# Patient Record
Sex: FEMALE | Race: WHITE | NOT HISPANIC OR LATINO | URBAN - METROPOLITAN AREA
[De-identification: names, ages, dates, MRNs, and addresses within clinical notes are randomized per-mention and may not be internally consistent; named-entity substitution may affect disease eponyms.]

---

## 2020-10-06 ENCOUNTER — VIRTUAL VISIT (OUTPATIENT)
Dept: FAMILY MEDICINE | Facility: OTHER | Age: 70
End: 2020-10-06
Payer: COMMERCIAL

## 2020-10-06 PROCEDURE — 99421 OL DIG E/M SVC 5-10 MIN: CPT | Performed by: PHYSICIAN ASSISTANT

## 2020-10-07 ENCOUNTER — AMBULATORY - HEALTHEAST (OUTPATIENT)
Dept: FAMILY MEDICINE | Facility: CLINIC | Age: 70
End: 2020-10-07

## 2020-10-07 DIAGNOSIS — Z20.822 SUSPECTED COVID-19 VIRUS INFECTION: ICD-10-CM

## 2020-10-08 ENCOUNTER — AMBULATORY - HEALTHEAST (OUTPATIENT)
Dept: FAMILY MEDICINE | Facility: CLINIC | Age: 70
End: 2020-10-08

## 2020-10-08 DIAGNOSIS — Z20.822 SUSPECTED COVID-19 VIRUS INFECTION: ICD-10-CM

## 2020-10-08 NOTE — PROGRESS NOTES
"Date: 10/06/2020 11:12:39  Clinician: Dandy Guerin  Clinician NPI: [id number]  Patient: Consuelo Rosenthal  Patient : 1950  Patient Address[place] [facility] [place] [id number]  Patient Phone: [phone]  Visit Protocol: URI  Patient Summary:  Consuelo is a 70 year old ( : 1950 ) female who initiated a [facility] Visit for COVID-19 (Coronavirus) evaluation and screening. When asked the question \"Please sign me up to receive news, health information and promotions from [facility].\", Consuelo responded \"Yes\".    Consuelo states her symptoms started 1-2 days ago.   Her symptoms consist of ear pain, a cough, nasal congestion, myalgia, malaise, and a sore throat.   Symptom details     Nasal secretions: The color of her mucus is clear.    Cough: Consuelo coughs a few times an hour and her cough is more bothersome at night. Phlegm does not come into her throat when she coughs. She believes her cough is caused by post-nasal drip.     Sore throat: Consuelo reports having mild throat pain (1-3 on a 10 point pain scale), does not have exudate on her tonsils, and can swallow liquids. The lymph nodes in her neck are not enlarged. A rash has not appeared on the skin since the sore throat started.      Consuelo denies having headache, wheezing, fever, enlarged lymph nodes, anosmia, vomiting, rhinitis, nausea, facial pain or pressure, chills, teeth pain, ageusia, and diarrhea. She also denies taking antibiotic medication in the past month and having recent facial or sinus surgery in the past 60 days. She is not experiencing dyspnea.   Precipitating events  Within the past week, Consuelo has not been exposed to someone with strep throat. She has not recently been exposed to someone with influenza. Consuelo has been in close contact with the following high risk individuals: adults 65 or older and people with asthma, heart disease or diabetes.   Pertinent COVID-19 (Coronavirus) information  In the past 14 days, " Consuelo has not worked in a congregate living setting.   She does not work or volunteer as healthcare worker or a  and does not work or volunteer in a healthcare facility.   Consuelo also has not lived in a congregate living setting in the past 14 days. She does not live with a healthcare worker.   Consuelo has not had a close contact with a laboratory-confirmed COVID-19 patient within 14 days of symptom onset.   Since December 2019, Consuelo and has not had upper respiratory infection or influenza-like illness. Has not been diagnosed with lab-confirmed COVID-19 test   Pertinent medical history  Consuelo does not get yeast infections when she takes antibiotics.   Consuelo does not need a return to work/school note.   Weight: 157 lbs   Consuelo does not smoke or use smokeless tobacco.   Weight: 157 lbs    MEDICATIONS: aspirin oral, metoprolol tartrate-hydrochlorothiazide oral, simvastatin oral, ALLERGIES: NKDA  Clinician Response:  Dear Consuelo,   Your symptoms show that you may have coronavirus (COVID-19). This illness can cause fever, cough and trouble breathing. Many people get a mild case and get better on their own. Some people can get very sick.  What should I do?  We would like to test you for this virus.   1. Please call [phone] to schedule your visit. Explain that you were referred by [facility] to have a COVID-19 test. Be ready to share your [facility] visit ID number.  The following will serve as your written order for this COVID Test, ordered by me, for the indication of suspected COVID [Z20.828]: The test will be ordered in [facility], our electronic health record, after you are scheduled. It will show as ordered and authorized by Chino Jacobson MD.  Order: COVID-19 (Coronavirus) PCR for SYMPTOMATIC testing from [facility].      2. When it's time for your COVID test:  Stay at least 6 feet away from others. (If someone will drive you to your test, stay in the backseat, as far away from the  as you  "can.)   Cover your mouth and nose with a mask, tissue or washcloth.  Go straight to the testing site. Don't make any stops on the way there or back.      3.Starting now: Stay home and away from others (self-isolate) until:   You've had no fever---and no medicine that reduces fever---for one full day (24 hours). And...   Your other symptoms have gotten better. For example, your cough or breathing has improved. And...   At least 10 days have passed since your symptoms started.       During this time, don't leave the house except for testing or medical care.   Stay in your own room, even for meals. Use your own bathroom if you can.   Stay away from others in your home. No hugging, kissing or shaking hands. No visitors.  Don't go to work, school or anywhere else.    Clean \"high touch\" surfaces often (doorknobs, counters, handles, etc.). Use a household cleaning spray or wipes. You'll find a full list of  on the EPA website: www.epa.gov/pesticide-registration/list-n-disinfectants-use-against-sars-cov-2.   Cover your mouth and nose with a mask, tissue or washcloth to avoid spreading germs.  Wash your hands and face often. Use soap and water.  Caregivers in these groups are at risk for severe illness due to COVID-19:  o People 65 years and older  o People who live in a nursing home or long-term care facility  o People with chronic disease (lung, heart, cancer, diabetes, kidney, liver, immunologic)  o People who have a weakened immune system, including those who:   Are in cancer treatment  Take medicine that weakens the immune system, such as corticosteroids  Had a bone marrow or organ transplant  Have an immune deficiency  Have poorly controlled HIV or AIDS  Are obese (body mass index of 40 or higher)  Smoke regularly   o Caregivers should wear gloves while washing dishes, handling laundry and cleaning bedrooms and bathrooms.  o Use caution when washing and drying laundry: Don't shake dirty laundry, and use the " warmest water setting that you can.  o For more tips, go to www.cdc.gov/coronavirus/2019-ncov/downloads/10Things.pdf.    How can I take care of myself?    Get lots of rest. Drink extra fluids (unless a doctor has told you not to).   Take Tylenol (acetaminophen) for fever or pain. If you have liver or kidney problems, ask your family doctor if it's okay to take Tylenol.   Adults can take either:    650 mg (two 325 mg pills) every 4 to 6 hours, or...   1,000 mg (two 500 mg pills) every 8 hours as needed.    Note: Don't take more than 3,000 mg in one day. Acetaminophen is found in many medicines (both prescribed and over-the-counter medicines). Read all labels to be sure you don't take too much.   For children, check the Tylenol bottle for the right dose. The dose is based on the child's age or weight.    If you have other health problems (like cancer, heart failure, an organ transplant or severe kidney disease): Call your specialty clinic if you don't feel better in the next 2 days.       Know when to call 911. Emergency warning signs include:    Trouble breathing or shortness of breath Pain or pressure in the chest that doesn't go away Feeling confused like you haven't felt before, or not being able to wake up Bluish-colored lips or face.  Where can I get more information?   [facility] -- About COVID-19: [email or web address]   CDC -- What to Do If You're Sick: www.cdc.gov/coronavirus/2019-ncov/about/steps-when-sick.html   CDC -- Ending Home Isolation: www.cdc.gov/coronavirus/2019-ncov/hcp/disposition-in-home-patients.html   CDC -- Caring for Someone: www.cdc.gov/coronavirus/2019-ncov/if-you-are-sick/care-for-someone.html   [facility] -- Interim Guidance for Hospital Discharge to Home: www.health.[facility].[place]/diseases/coronavirus/hcp/hospdischarge.pdf   [facility] clinical trials (COVID-19 research studies): clinicalaffairs.[facility]-clinical-trials    Below are the COVID-19 hotlines at the  [place] Department of Health ([facility]). Interpreters are available.    For health questions: Call 604-493-9937 or 1-577.260.1693 (7 a.m. to 7 p.m.) For questions about schools and childcare: Call 518-500-1571 or 1-816.638.6983 (7 a.m. to 7 p.m.)    Diagnosis: Nasal congestion  Diagnosis ICD: R09.81

## 2020-10-10 ENCOUNTER — COMMUNICATION - HEALTHEAST (OUTPATIENT)
Dept: SCHEDULING | Facility: CLINIC | Age: 70
End: 2020-10-10

## 2024-06-01 ENCOUNTER — TRANSFERRED RECORDS (OUTPATIENT)
Dept: MULTI SPECIALTY CLINIC | Facility: CLINIC | Age: 74
End: 2024-06-01

## 2025-02-01 ENCOUNTER — NURSE TRIAGE (OUTPATIENT)
Dept: NURSING | Facility: CLINIC | Age: 75
End: 2025-02-01
Payer: COMMERCIAL

## 2025-02-01 NOTE — TELEPHONE ENCOUNTER
Spouse calling. Tested her, she's been sick for a couple of days. Tested positive for covid-19. Obtained consent to communicate. There is a humming in the head in general. Headache too.      COVID Positive/Requesting COVID treatment    Patient is positive for COVID and requesting treatment options.    Date of positive COVID test (PCR or at home)? 2/1/25  Current COVID symptoms: fever or chills, cough, fatigue, muscle or body aches, headache, sore throat, and congestion or runny nose  Date COVID symptoms began: 1/31    Message should be routed to clinic RN pool. Best phone number to use for call back: [phone] 's number.    I connected with scheduling to set up a virtual visit for treatment with Paxlovid.      Shell Sandra RN  [facility]    Reason for Disposition   HIGH RISK patient (e.g., weak immune system, age > 64 years, obesity with BMI of 30 or higher, pregnant, chronic lung disease or other chronic medical condition) and COVID symptoms (e.g., cough, fever)  (Exceptions: Already seen by doctor or NP/PA and no new or worsening symptoms.)    Additional Information   Negative: SEVERE difficulty breathing (e.g., struggling for each breath, speaks in single words)   Negative: Difficult to awaken or acting confused (e.g., disoriented, slurred speech)   Negative: Bluish (or gray) lips or face now   Negative: Shock suspected (e.g., cold/pale/clammy skin, too weak to stand, low BP, rapid pulse)   Negative: Sounds like a life-threatening emergency to the triager   Negative: SEVERE or constant chest pain or pressure  (Exception: Mild central chest pain, present only when coughing.)   Negative: MODERATE difficulty breathing (e.g., speaks in phrases, SOB even at rest, pulse 100-120)   Negative: Headache and stiff neck (can't touch chin to chest)     headache   Negative: Oxygen level (e.g., pulse oximetry) 90% or lower     Sats 95%, pulse 74   Negative: Chest pain or pressure  (Exception: MILD  central chest pain, present only when coughing.)   Negative: Drinking very little and dehydration suspected (e.g., no urine > 12 hours, very dry mouth, very lightheaded)   Negative: Patient sounds very sick or weak to the triager   Negative: MILD difficulty breathing (e.g., minimal/no SOB at rest, SOB with walking, pulse <100)   Negative: Fever > 103 F (39.4 C)   Negative: Fever > 101 F (38.3 C) and over 60 years of age     96.9 ear temp   Negative: Fever > 100.0 F (37.8 C) and bedridden (e.g., CVA, chronic illness, recovering from surgery)    Protocols used: Coronavirus (COVID-19) Diagnosed or [id number]

## 2025-02-03 ENCOUNTER — TELEPHONE (OUTPATIENT)
Dept: FAMILY MEDICINE | Facility: CLINIC | Age: 75
End: 2025-02-03

## 2025-02-03 ENCOUNTER — VIRTUAL VISIT (OUTPATIENT)
Dept: FAMILY MEDICINE | Facility: CLINIC | Age: 75
End: 2025-02-03
Payer: COMMERCIAL

## 2025-02-03 DIAGNOSIS — R00.1 BRADYCARDIA: ICD-10-CM

## 2025-02-03 DIAGNOSIS — R05.1 ACUTE COUGH: ICD-10-CM

## 2025-02-03 DIAGNOSIS — N18.31 CHRONIC KIDNEY DISEASE, STAGE 3A (H): ICD-10-CM

## 2025-02-03 DIAGNOSIS — I10 BENIGN ESSENTIAL HYPERTENSION: ICD-10-CM

## 2025-02-03 DIAGNOSIS — R09.81 HEAD CONGESTION: ICD-10-CM

## 2025-02-03 DIAGNOSIS — U07.1 INFECTION DUE TO 2019 NOVEL CORONAVIRUS: Primary | ICD-10-CM

## 2025-02-03 DIAGNOSIS — U07.1 INFECTION DUE TO 2019 NOVEL CORONAVIRUS: ICD-10-CM

## 2025-02-03 PROCEDURE — 98002 SYNCH AUDIO-VIDEO NEW MOD 45: CPT | Performed by: INTERNAL MEDICINE

## 2025-02-03 NOTE — TELEPHONE ENCOUNTER
Dr. Michel,   Pt had paxlovid ordered to [facility] in [place]. This [facility] is going to be temporarily closed. Pt requesting prescription be sent to [facility] in [place]. Medication and pharmacy pended for your review. Pt would like to start Paxlovid as soon as possible so routing high priority.     Sheryl Suarez RN

## 2025-02-03 NOTE — PROGRESS NOTES
Consuelo is a 74 year old who is being evaluated via a billable video visit.    How would you like to obtain your AVS? MyChart  If the video visit is dropped, the invitation should be resent by: Text to cell phone: [phone]  Will anyone else be joining your video visit? No      Assessment & Plan   Problem List Items Addressed This Visit    None  Visit Diagnoses       Infection due to 2019 novel coronavirus    -  Primary    Started on Paxlovid discussed drug-drug interaction hold simvastatin for 8 days.  Monitor blood pressure.  Dose adjustment given chronic kidney disease 3a    Relevant Medications    nirmatrelvir and ritonavir (PAXLOVID) 300 mg/100 mg therapy pack    Head congestion        Antihistamine over-the-counter as needed.  No vomiting no lethargy.  If worsening symptoms seek immediate medical attention    Acute cough        Mild no respiratory distress.  No wheezing.  No purulent production    Benign essential hypertension        Monitor blood pressure, if decreasing hold losartan.    Bradycardia        Heart rate at rest was 40 is increased to 60 with ambulation good chronotropic response.  On metoprolol monitor heart rate follow-up with cardiology.    Chronic kidney disease, stage 3a (H)        Stable chronic.  On ARB's.  Keep hydrated.  Avoid nonsteroidals.           Hold simvastatin for 8-day ,  COVID infection start on Paxlovid 5-day course dose adjustment to kidney disease chronic kidney disease stage IIIa keep well-hydrated, Tylenol as needed for headache can use nasal saline rinses and antihistamine such as Claritin or Zyrtec for head congestion.  Patient not in any distress.  Monitor blood pressure is decreasing cut down losartan.  Monitor heart rate if persistently bradycardic to stop metoprolol and follow-up with heart specialist.  She had good chronotropic response with ambulation with heart rate up to the 60s.  All questions answered.       Work on weight loss  Regular exercise  See  Patient Instructions      Moe Cordero is a 74 year old, presenting for the following health issues:  Covid Concern         No data to display                Via the Health Maintenance questionnaire, the patient has reported the following services have been completed -Mammogram: [facility] 2024-06-01, this information has been sent to the abstraction team.  History of Present Illness       Reason for visit:  COVID  Symptom onset:  3-7 days ago  Symptoms include:  Headache, stuffed head, body aches  Symptom intensity:  Moderate  Symptom progression:  Staying the same   She is taking medications regularly.           COVID-19 Symptom Review  How many days ago did these symptoms start? 3 days    Are any of the following symptoms significant for you?  New or worsening difficulty breathing? No  Worsening cough? Yes, it's a dry cough.   Fever or chills? Yes, I felt feverish or had chills.  Headache: YES  Sore throat: No  Chest pain: No  Diarrhea: No  Body aches? YES    What treatments has patient tried? Acetaminophen   Does patient live in a nursing home, group home, or shelter? No  Does patient have a way to get food/medications during quarantined? Yes, I have a friend or family member who can help me.          Patient presents for evaluation of COVID infection symptoms started last Friday tested positive for COVID, had some head congestion mild, she has minimal cough.  No shortness of breath or wheezing.  No phlegm production.  Had some chills but no fever.  Taking Tylenol.  She is on losartan metoprolol and simvastatin in addition to baby aspirin D3.  Her heart rate at rest was in the low 40s with walking went up to 60s.  Pulse ox was 98 to 99% on room air    Review of Systems  Constitutional, HEENT, cardiovascular, pulmonary, gi and gu systems are negative, except as otherwise noted.      Objective           Vitals:  No vitals were obtained today due to virtual visit.    Physical Exam   GENERAL: alert  and no distress  EYES: Eyes grossly normal to inspection.  No discharge or erythema, or obvious scleral/conjunctival abnormalities.  RESP: No audible wheeze, cough, or visible cyanosis.    SKIN: Visible skin clear. No significant rash, abnormal pigmentation or lesions.  NEURO: Cranial nerves grossly intact.  Mentation and speech appropriate for age.  PSYCH: Appropriate affect, tone, and pace of words    Records - [facility] on 11/26/2013   Component Date Value Ref Range Status    Triglycerides 11/26/2013 61  <150 mg/dL Final    LDL Cholesterol Calculated 11/26/2013 124  <130 mg/dL Final    Direct Measure HDL 11/26/2013 81  >39 mg/dL Final    Cholesterol 11/26/2013 218 (H)  <200 mg/dL Final         Video-Visit Details    Type of service:  Video Visit, video start 9:13 AM, video end 9:33 AM.  Originating Location (pt. Location): Home    Distant Location (provider location):  On-site  Platform used for Video Visit: Favio  Signed Electronically by: Jason Michel MD

## 2025-02-05 ENCOUNTER — DOCUMENTATION ONLY (OUTPATIENT)
Dept: FAMILY MEDICINE | Facility: CLINIC | Age: 75
End: 2025-02-05
Payer: COMMERCIAL

## 2025-02-06 NOTE — PROGRESS NOTES
Message  Received: 2 days ago  Dawna Wayne PharmJason Pena MD  Recommendation for GFR 30-60ml/min is reduced dose -  150 mg nirmatrelvir (one 150 mg tablet) with 100 mg ritonavir (one 100 mg tablet), with both tablets taken together twice daily for 5 day    Dawna Wayne PharmD, [facility]  Medication Therapy Management Provider  [phone]          Previous Messages       ----- Message -----  From: Jason Michel MD  Sent: 2/3/2025   9:32 AM CST  To: Dawna Wayne PharmD    Hi Dawna  paxlovid dose is 2 tab bid,  given GFR in mid 50's.  COVID positive.    Thanks    Jason

## 2025-02-15 ENCOUNTER — HEALTH MAINTENANCE LETTER (OUTPATIENT)
Age: 75
End: 2025-02-15